# Patient Record
Sex: MALE
[De-identification: names, ages, dates, MRNs, and addresses within clinical notes are randomized per-mention and may not be internally consistent; named-entity substitution may affect disease eponyms.]

---

## 2017-03-17 PROBLEM — Z00.00 ENCOUNTER FOR PREVENTIVE HEALTH EXAMINATION: Status: ACTIVE | Noted: 2017-03-17

## 2019-03-13 ENCOUNTER — APPOINTMENT (OUTPATIENT)
Dept: PULMONOLOGY | Facility: CLINIC | Age: 69
End: 2019-03-13
Payer: MEDICARE

## 2019-03-13 VITALS
HEART RATE: 62 BPM | BODY MASS INDEX: 23.04 KG/M2 | HEIGHT: 68 IN | WEIGHT: 152 LBS | OXYGEN SATURATION: 96 % | SYSTOLIC BLOOD PRESSURE: 126 MMHG | TEMPERATURE: 97.4 F | DIASTOLIC BLOOD PRESSURE: 54 MMHG | RESPIRATION RATE: 20 BRPM

## 2019-03-13 DIAGNOSIS — Z86.39 PERSONAL HISTORY OF OTHER ENDOCRINE, NUTRITIONAL AND METABOLIC DISEASE: ICD-10-CM

## 2019-03-13 DIAGNOSIS — F17.200 NICOTINE DEPENDENCE, UNSPECIFIED, UNCOMPLICATED: ICD-10-CM

## 2019-03-13 DIAGNOSIS — Z86.79 PERSONAL HISTORY OF OTHER DISEASES OF THE CIRCULATORY SYSTEM: ICD-10-CM

## 2019-03-13 PROCEDURE — 94727 GAS DIL/WSHOT DETER LNG VOL: CPT

## 2019-03-13 PROCEDURE — 94729 DIFFUSING CAPACITY: CPT

## 2019-03-13 PROCEDURE — 94060 EVALUATION OF WHEEZING: CPT

## 2019-03-13 PROCEDURE — 99204 OFFICE O/P NEW MOD 45 MIN: CPT | Mod: 25

## 2019-03-13 RX ORDER — ASPIRIN 81 MG/1
81 TABLET, CHEWABLE ORAL
Qty: 36 | Refills: 0 | Status: ACTIVE | COMMUNITY
Start: 2019-02-05

## 2019-03-13 RX ORDER — CARVEDILOL 12.5 MG/1
12.5 TABLET, FILM COATED ORAL
Qty: 60 | Refills: 0 | Status: ACTIVE | COMMUNITY
Start: 2019-03-07

## 2019-03-13 RX ORDER — SODIUM POLYSTYRENE SULFONATE 4.1 MEQ/G
POWDER, FOR SUSPENSION ORAL; RECTAL
Qty: 454 | Refills: 0 | Status: COMPLETED | COMMUNITY
Start: 2019-02-05

## 2019-03-13 RX ORDER — FUROSEMIDE 20 MG/1
20 TABLET ORAL
Qty: 60 | Refills: 0 | Status: ACTIVE | COMMUNITY
Start: 2019-03-07

## 2019-03-13 RX ORDER — HYDROCORTISONE 1 %
12 CREAM (GRAM) TOPICAL
Qty: 400 | Refills: 0 | Status: COMPLETED | COMMUNITY
Start: 2019-02-05

## 2019-03-13 RX ORDER — IPRATROPIUM BROMIDE AND ALBUTEROL SULFATE 2.5; .5 MG/3ML; MG/3ML
0.5-2.5 (3) SOLUTION RESPIRATORY (INHALATION)
Qty: 90 | Refills: 0 | Status: COMPLETED | COMMUNITY
Start: 2019-03-07

## 2019-03-13 RX ORDER — ALBUTEROL SULFATE 90 UG/1
108 (90 BASE) AEROSOL, METERED RESPIRATORY (INHALATION)
Qty: 18 | Refills: 0 | Status: ACTIVE | COMMUNITY
Start: 2019-02-05

## 2019-03-13 RX ORDER — TIOTROPIUM BROMIDE 18 UG/1
18 CAPSULE ORAL; RESPIRATORY (INHALATION)
Qty: 30 | Refills: 0 | Status: COMPLETED | COMMUNITY
Start: 2019-02-05

## 2019-03-13 RX ORDER — METHYLPREDNISOLONE 4 MG/1
4 TABLET ORAL
Qty: 21 | Refills: 0 | Status: COMPLETED | COMMUNITY
Start: 2019-02-05

## 2019-03-13 RX ORDER — ATORVASTATIN CALCIUM 40 MG/1
40 TABLET, FILM COATED ORAL
Qty: 30 | Refills: 0 | Status: ACTIVE | COMMUNITY
Start: 2019-03-07

## 2019-03-13 RX ORDER — MAGNESIUM OXIDE 241.3 MG/1000MG
400 TABLET ORAL
Qty: 7 | Refills: 0 | Status: COMPLETED | COMMUNITY
Start: 2019-02-08

## 2019-03-13 NOTE — REVIEW OF SYSTEMS
[Cough] : cough [Dyspnea] : dyspnea [Snoring] : snoring [Fever] : no fever [Postnasal Drip] : no postnasal drip [Hemoptysis] : no hemoptysis [Wheezing] : no wheezing [Chest Discomfort] : no chest discomfort [Nasal Discharge] : no nasal discharge [Heartburn] : no heartburn [Back Pain] : ~T no back pain [Rash] : no [unfilled] rash [Anemia] : no anemia [Headache] : no headache [Depression] : no depression [Thyroid Problem] : no thyroid problem [DVT] : no DVT

## 2019-03-13 NOTE — PHYSICAL EXAM
[General Appearance - In No Acute Distress] : no acute distress [Neck Cervical Mass (___cm)] : no neck mass was observed [Heart Sounds] : normal S1 and S2 [Auscultation Breath Sounds / Voice Sounds] : lungs were clear to auscultation bilaterally [] : no hepato-splenomegaly [Abnormal Walk] : normal gait [Nail Clubbing] : no clubbing of the fingernails [Skin Turgor] : normal skin turgor [No Focal Deficits] : no focal deficits [Oriented To Time, Place, And Person] : oriented to person, place, and time [Erythema] : no erythema of the pharynx

## 2019-03-13 NOTE — HISTORY OF PRESENT ILLNESS
[FreeTextEntry1] : He is a 68-year-old smoker. He was hospitalized in March of 2018 for pneumonia. He was referred here for followup. Complains of dyspnea exertion. Walks one block and has to stop to catch his breath. No chest pain, pressure or palpitations. Her cough is nonproductive. No hemoptysis. No constitutional symptoms. Smokes one to 2 cigarettes per day now. He is retired from [employer].

## 2019-03-13 NOTE — DISCUSSION/SUMMARY
[FreeTextEntry1] : He is a 68-year-old smoker with severe COPD. He also has abnormal radiographic findings as noted above.\par \par For his COPD he was placed on Trelegy. He has shown how to use it. A sample was given to him.\par \par The CT images will be reviewed. A decision regarding tissue sampling will be considered.\par \par I will see him again in 2 weeks.

## 2019-03-13 NOTE — PROCEDURE
[FreeTextEntry1] : Pulmonary function test was performed 3/13/19. Severe obstructive airways disease noted. Moderate restriction also present. There was a severe reduction in diffusion.\par \par CT examination of the chest was performed at [facility] on March 7, 2019. The report describes bilateral pleural effusions. Pleural thickening noted in the right hemithorax. Consolidation in the right lower lobe also present. Minimal pericardial effusion. Resolution of previous pneumothorax. Rounded atelectasis suspected.The images were not available for review.

## 2019-03-16 RX ORDER — TIOTROPIUM BROMIDE INHALATION SPRAY 3.12 UG/1
2.5 SPRAY, METERED RESPIRATORY (INHALATION) DAILY
Qty: 1 | Refills: 2 | Status: ACTIVE | COMMUNITY
Start: 2019-03-16 | End: 1900-01-01

## 2019-03-16 RX ORDER — BUDESONIDE AND FORMOTEROL FUMARATE DIHYDRATE 160; 4.5 UG/1; UG/1
160-4.5 AEROSOL RESPIRATORY (INHALATION) TWICE DAILY
Qty: 1 | Refills: 2 | Status: ACTIVE | COMMUNITY
Start: 2019-02-05 | End: 1900-01-01

## 2019-03-27 ENCOUNTER — APPOINTMENT (OUTPATIENT)
Dept: PULMONOLOGY | Facility: CLINIC | Age: 69
End: 2019-03-27
Payer: MEDICARE

## 2019-03-27 VITALS
SYSTOLIC BLOOD PRESSURE: 154 MMHG | WEIGHT: 150 LBS | OXYGEN SATURATION: 98 % | DIASTOLIC BLOOD PRESSURE: 78 MMHG | TEMPERATURE: 97.4 F | HEART RATE: 61 BPM | BODY MASS INDEX: 22.81 KG/M2 | RESPIRATION RATE: 20 BRPM

## 2019-03-27 PROCEDURE — 99214 OFFICE O/P EST MOD 30 MIN: CPT

## 2019-03-27 NOTE — REVIEW OF SYSTEMS
[Cough] : cough [Dyspnea] : dyspnea [Snoring] : snoring [Fever] : no fever [Fatigue] : no fatigue [Postnasal Drip] : no postnasal drip [Sputum] : not coughing up ~M sputum [Hemoptysis] : no hemoptysis [Chest Tightness] : no chest tightness [Wheezing] : no wheezing [Chest Discomfort] : no chest discomfort [Nasal Discharge] : no nasal discharge [Heartburn] : no heartburn [Back Pain] : ~T no back pain [Rash] : no [unfilled] rash [Anemia] : no anemia [Headache] : no headache [Depression] : no depression [Thyroid Problem] : no thyroid problem [DVT] : no DVT

## 2019-03-27 NOTE — PHYSICAL EXAM
[General Appearance - In No Acute Distress] : no acute distress [Neck Cervical Mass (___cm)] : no neck mass was observed [Heart Sounds] : normal S1 and S2 [Abnormal Walk] : normal gait [Nail Clubbing] : no clubbing of the fingernails [No Focal Deficits] : no focal deficits [Oriented To Time, Place, And Person] : oriented to person, place, and time [Normal Appearance] : normal appearance [Erythema] : no erythema of the pharynx [] : no respiratory distress [FreeTextEntry1] : Breath sounds are diminished at the right base. There is no wheezing. A few rhonchi were present. [Skin Turgor] : normal skin turgor

## 2019-03-27 NOTE — HISTORY OF PRESENT ILLNESS
[FreeTextEntry1] : He is a 68 year-old current smoker. He was hospitalized at [facility] in March of 2018 for pneumonia. He was referred here for followup. He was found to have a right pleural effusion. He underwent thoracentesis twice which was not diagnostic. He complains of dyspnea exertion. Walks one block and has to stop to catch his breath. No chest pain, pressure or palpitations. Her cough is nonproductive. No hemoptysis. No constitutional symptoms. Smokes one to two cigarettes per day now. He is retired from [employer].He believes he was exposed to asbestos.

## 2019-03-27 NOTE — PROCEDURE
[FreeTextEntry1] : Pulmonary function test was performed 3/13/19. Severe obstructive airways disease noted. Moderate restriction also present. There was a severe reduction in diffusion.\par \par CT examination of the chest was performed at [facility] on March 7, 2019. The report describes bilateral pleural effusions. Pleural thickening noted in the right hemithorax. Consolidation in the right lower lobe also present. Minimal pericardial effusion. Resolution of previous pneumothorax. Rounded atelectasis suspected.The images were not available for review.

## 2019-03-27 NOTE — DISCUSSION/SUMMARY
[FreeTextEntry1] : He is a 68 year-old smoker with severe COPD. He also has abnormal radiographic findings as noted above and a history of probable asbestos exposure. \par \par For his COPD he is to continue with Symbicort and Spiriva. Trelegy was tried but not covered. \par \par The CT images were reviewed. The findings are discussed with him. The various options were considered. Thoracoscopy with biopsy was explained to him. He preferred not to have any more surgical procedures given that he is feeling well. He prefers to observe. If it was explained to him that he may have cancer and that observation may result in a delay of diagnosis. \par \par Smoking cessation was advised.\par \par I will see him again in three months.

## 2019-07-10 ENCOUNTER — APPOINTMENT (OUTPATIENT)
Dept: PULMONOLOGY | Facility: CLINIC | Age: 69
End: 2019-07-10
Payer: MEDICARE

## 2019-07-10 VITALS
TEMPERATURE: 97.3 F | RESPIRATION RATE: 18 BRPM | DIASTOLIC BLOOD PRESSURE: 56 MMHG | OXYGEN SATURATION: 93 % | BODY MASS INDEX: 22.5 KG/M2 | SYSTOLIC BLOOD PRESSURE: 120 MMHG | HEART RATE: 70 BPM | WEIGHT: 148 LBS

## 2019-07-10 DIAGNOSIS — J44.9 CHRONIC OBSTRUCTIVE PULMONARY DISEASE, UNSPECIFIED: ICD-10-CM

## 2019-07-10 DIAGNOSIS — R93.89 ABNORMAL FINDINGS ON DIAGNOSTIC IMAGING OF OTHER SPECIFIED BODY STRUCTURES: ICD-10-CM

## 2019-07-10 PROCEDURE — 94729 DIFFUSING CAPACITY: CPT

## 2019-07-10 PROCEDURE — 99215 OFFICE O/P EST HI 40 MIN: CPT | Mod: 25

## 2019-07-10 PROCEDURE — 94727 GAS DIL/WSHOT DETER LNG VOL: CPT

## 2019-07-10 PROCEDURE — 94060 EVALUATION OF WHEEZING: CPT

## 2019-07-10 RX ORDER — FLUTICASONE FUROATE, UMECLIDINIUM BROMIDE AND VILANTEROL TRIFENATATE 100; 62.5; 25 UG/1; UG/1; UG/1
100-62.5-25 POWDER RESPIRATORY (INHALATION) DAILY
Qty: 1 | Refills: 1 | Status: DISCONTINUED | COMMUNITY
Start: 2019-03-13 | End: 2019-07-10

## 2019-07-10 NOTE — REVIEW OF SYSTEMS
[Dyspnea] : dyspnea [Snoring] : snoring [Fever] : no fever [Fatigue] : no fatigue [Chills] : no chills [Poor Appetite] : normal appetite  [Postnasal Drip] : no postnasal drip [Cough] : no cough [Sputum] : not coughing up ~M sputum [Hemoptysis] : no hemoptysis [Chest Tightness] : no chest tightness [Wheezing] : no wheezing [Chest Discomfort] : no chest discomfort [Nasal Discharge] : no nasal discharge [Heartburn] : no heartburn [Back Pain] : ~T no back pain [Anemia] : no anemia [Rash] : no [unfilled] rash [Headache] : no headache [Depression] : no depression [Thyroid Problem] : no thyroid problem [DVT] : no DVT

## 2019-07-10 NOTE — HISTORY OF PRESENT ILLNESS
[FreeTextEntry1] : He is a 68 year-old current smoker. He was hospitalized at [facility] in March of 2018 for pneumonia. He was referred here for follow up. He was found to have a right pleural effusion. He underwent thoracentesis twice which was not diagnostic. He complains of dyspnea exertion. Walks one block and has to stop to catch his breath. No chest pain, pressure or palpitations. Her cough is nonproductive. No hemoptysis. No constitutional symptoms. He is retired from [employer].He believes he was exposed to asbestos. Smokes a few cigarettes per week.On Symbicort one puff BID. Also uses nebulizer once daily and Spiriva. Has Ventolin. Uses it less than once a week.

## 2019-07-10 NOTE — DISCUSSION/SUMMARY
[FreeTextEntry1] : He is a 68 year-old smoker with severe COPD. He also has abnormal radiographic findings as noted above and a history of probable asbestos exposure. \par \par For his COPD he is to continue with Symbicort and Spiriva.Follow up Chest CT advised. Smoking cessation was also advised.\par \par Should follow up with PCP regarding his leg edema. May need to see Cardiology as well. \par \par I will see him again in three months.

## 2019-07-10 NOTE — PHYSICAL EXAM
[Normal Appearance] : normal appearance [General Appearance - In No Acute Distress] : no acute distress [Heart Sounds] : normal S1 and S2 [Neck Cervical Mass (___cm)] : no neck mass was observed [] : no hepato-splenomegaly [Abnormal Walk] : normal gait [Nail Clubbing] : no clubbing of the fingernails [No Focal Deficits] : no focal deficits [Oriented To Time, Place, And Person] : oriented to person, place, and time [Skin Turgor] : normal skin turgor [Erythema] : no erythema of the pharynx [FreeTextEntry1] : Breath sounds are diminished at the right base. There is no wheezing. A few rhonchi were present. [2+ Pitting] : 2+  pitting

## 2019-07-10 NOTE — PROCEDURE
[FreeTextEntry1] : Pulmonary function test was performed 3/13/19. Severe obstructive airways disease noted. Moderate restriction also present. There was a severe reduction in diffusion.\par \par PFT 7/10/19: Severe obstructive disease noted. Restriction also present. Diffusion was severely reduced. No significant change after inhalation of bronchodilator.\par \par CT examination of the chest was performed at [facility] on March 7, 2019. The report describes bilateral pleural effusions. Pleural thickening noted in the right hemithorax. Consolidation in the right lower lobe also present. Minimal pericardial effusion. Resolution of previous pneumothorax. Rounded atelectasis suspected.

## 2019-10-16 ENCOUNTER — APPOINTMENT (OUTPATIENT)
Dept: PULMONOLOGY | Facility: CLINIC | Age: 69
End: 2019-10-16